# Patient Record
Sex: MALE | URBAN - METROPOLITAN AREA
[De-identification: names, ages, dates, MRNs, and addresses within clinical notes are randomized per-mention and may not be internally consistent; named-entity substitution may affect disease eponyms.]

---

## 2021-03-04 ENCOUNTER — OFFICE VISIT (OUTPATIENT)
Dept: CARDIOLOGY | Facility: CLINIC | Age: 61
End: 2021-03-04
Attending: INTERNAL MEDICINE
Payer: MEDICARE

## 2021-03-04 VITALS
SYSTOLIC BLOOD PRESSURE: 145 MMHG | HEART RATE: 54 BPM | OXYGEN SATURATION: 97 % | HEIGHT: 73 IN | WEIGHT: 246 LBS | DIASTOLIC BLOOD PRESSURE: 90 MMHG | BODY MASS INDEX: 32.6 KG/M2

## 2021-03-04 DIAGNOSIS — I48.0 PAROXYSMAL ATRIAL FIBRILLATION (H): Primary | ICD-10-CM

## 2021-03-04 PROCEDURE — 99203 OFFICE O/P NEW LOW 30 MIN: CPT | Mod: GC | Performed by: INTERNAL MEDICINE

## 2021-03-04 PROCEDURE — G0463 HOSPITAL OUTPT CLINIC VISIT: HCPCS

## 2021-03-04 RX ORDER — CLOPIDOGREL BISULFATE 75 MG/1
75 TABLET ORAL DAILY
COMMUNITY
Start: 2021-03-02

## 2021-03-04 RX ORDER — METOPROLOL TARTRATE 25 MG/1
25 TABLET, FILM COATED ORAL DAILY
COMMUNITY
Start: 2020-11-23

## 2021-03-04 RX ORDER — HYDROCODONE BITARTRATE AND ACETAMINOPHEN 5; 325 MG/1; MG/1
TABLET ORAL
COMMUNITY
Start: 2020-09-26

## 2021-03-04 RX ORDER — METHOCARBAMOL 500 MG/1
500 TABLET, FILM COATED ORAL
COMMUNITY
Start: 2020-12-04

## 2021-03-04 RX ORDER — ALBUTEROL SULFATE 90 UG/1
AEROSOL, METERED RESPIRATORY (INHALATION)
COMMUNITY
Start: 2021-03-02

## 2021-03-04 RX ORDER — NITROGLYCERIN 0.4 MG/1
0.4 TABLET SUBLINGUAL PRN
COMMUNITY
Start: 2021-01-04

## 2021-03-04 RX ORDER — OXYCODONE HYDROCHLORIDE 5 MG/1
TABLET ORAL
COMMUNITY
Start: 2020-11-23

## 2021-03-04 RX ORDER — ACETAMINOPHEN 500 MG
500 TABLET ORAL PRN
COMMUNITY
Start: 2020-12-04

## 2021-03-04 RX ORDER — ATORVASTATIN CALCIUM 40 MG/1
40 TABLET, FILM COATED ORAL DAILY
COMMUNITY
Start: 2020-12-04

## 2021-03-04 RX ORDER — METOPROLOL SUCCINATE 100 MG/1
100 TABLET, EXTENDED RELEASE ORAL
COMMUNITY
Start: 2020-12-04

## 2021-03-04 RX ORDER — LEVETIRACETAM 1000 MG/1
TABLET ORAL
COMMUNITY
Start: 2021-01-18

## 2021-03-04 RX ORDER — ALBUTEROL SULFATE 90 UG/1
1-2 AEROSOL, METERED RESPIRATORY (INHALATION) DAILY
COMMUNITY
Start: 2021-01-08

## 2021-03-04 RX ORDER — LISINOPRIL AND HYDROCHLOROTHIAZIDE 12.5; 2 MG/1; MG/1
TABLET ORAL
COMMUNITY
Start: 2020-10-17

## 2021-03-04 RX ORDER — FLUTICASONE PROPIONATE 50 MCG
1 SPRAY, SUSPENSION (ML) NASAL DAILY
COMMUNITY
Start: 2021-01-04

## 2021-03-04 RX ORDER — LACOSAMIDE 100 MG/1
100 TABLET, FILM COATED ORAL 2 TIMES DAILY
COMMUNITY
Start: 2021-02-18

## 2021-03-04 RX ORDER — DABIGATRAN ETEXILATE 150 MG/1
150 CAPSULE ORAL DAILY
COMMUNITY
Start: 2021-03-01

## 2021-03-04 SDOH — HEALTH STABILITY: MENTAL HEALTH: HOW OFTEN DO YOU HAVE 6 OR MORE DRINKS ON ONE OCCASION?: NEVER

## 2021-03-04 SDOH — HEALTH STABILITY: MENTAL HEALTH: HOW MANY STANDARD DRINKS CONTAINING ALCOHOL DO YOU HAVE ON A TYPICAL DAY?: NOT ASKED

## 2021-03-04 SDOH — HEALTH STABILITY: MENTAL HEALTH: HOW OFTEN DO YOU HAVE A DRINK CONTAINING ALCOHOL?: NEVER

## 2021-03-04 ASSESSMENT — MIFFLIN-ST. JEOR: SCORE: 1989.73

## 2021-03-04 ASSESSMENT — PAIN SCALES - GENERAL: PAINLEVEL: NO PAIN (0)

## 2021-03-04 NOTE — PROGRESS NOTES
Cardiology Consultation     Jose Jimenez MRN# [id number]       Reason for consult: Evaluation for possible watchmann    HPI: Jose Jimenez is a 60 year old male with history including recurrent bilateral ischemic strokes that have occurred since early 2000s, history of flutter addressed with ablation in 2011/2012, history of atrial fibrillation picked up on an implantable loop recorder device of only 18 minutes long, chronic anticoagulation with Plavix and Pradaxa who presents for evaluation of possible Watchmann device.  He is referred by Dr. Arguello for evaluation and possible Watchmann.  Denies palpitations.  No infectious symptoms.  No anginal symptoms.    Neuro History:  2002: Brain MRI showed small vessel ischemic disease (patient's age ~ 42 years)  2003: Brain imaging revealed evidence of old frontal infarct  2004: Acute stroke  5/25/2015: Ischemic stroke  3/4/2016: TIA  3/21/2016: Left parietal stroke  6/2/2017: Subdural hematoma  7/14/2017: TIA  10/25/2020: Right MCA territory ischemic stroke (most recent)    Past Medical History:  Atrial flutter  Hyperlipidemia  Atrial fibrillation  Strokes as detailed above  Prior intracranial bleed  Seizures    Past Surgical History:  As above    Allergies:   No Known Allergies    Medications:  acetaminophen (TYLENOL) 500 MG tablet, Take 500 mg by mouth as needed  albuterol (PROAIR HFA/PROVENTIL HFA/VENTOLIN HFA) 108 (90 Base) MCG/ACT inhaler, Inhale 1-2 puffs into the lungs daily  albuterol (PROAIR HFA/PROVENTIL HFA/VENTOLIN HFA) 108 (90 Base) MCG/ACT inhaler,   atorvastatin (LIPITOR) 40 MG tablet, Take 40 mg by mouth daily  clopidogrel (PLAVIX) 75 MG tablet, Take 75 mg by mouth daily  dabigatran ANTICOAGULANT (PRADAXA ANTICOAGULANT) 150 MG capsule, Take 150 mg by mouth daily  fluticasone (FLONASE) 50 MCG/ACT nasal spray, Spray 1 spray in nostril daily  HYDROcodone-acetaminophen (NORCO) 5-325 MG tablet, TK 1 T PO  Q 8 H PRN P  levETIRAcetam (KEPPRA)  1000 MG tablet, TAKE 2 TABLETS BY MOUTH TWICE DAILY  lisinopril-hydrochlorothiazide (ZESTORETIC) 20-12.5 MG tablet, TK 1 T PO D  methocarbamol (ROBAXIN) 500 MG tablet, Take 500 mg by mouth  metoprolol succinate ER (TOPROL-XL) 100 MG 24 hr tablet, Take 100 mg by mouth  metoprolol tartrate (LOPRESSOR) 25 MG tablet, Take 25 mg by mouth daily  nitroGLYcerin (NITROSTAT) 0.4 MG sublingual tablet, Place 0.4 mg under the tongue as needed  oxyCODONE (ROXICODONE) 5 MG tablet, TK 1 TO 2 TS PO Q 6 H PRF MODERATE TO SEVERE PAIN  VIMPAT 100 MG TABS tablet, Take 100 mg by mouth 2 times daily    No current facility-administered medications on file prior to visit.       Social History:  Social History     Socioeconomic History     Marital status: Single     Spouse name: Not on file     Number of children: Not on file     Years of education: Not on file     Highest education level: Not on file   Occupational History     Not on file   Social Needs     Financial resource strain: Not on file     Food insecurity     Worry: Not on file     Inability: Not on file     Transportation needs     Medical: Not on file     Non-medical: Not on file   Tobacco Use     Smoking status: Never Smoker     Smokeless tobacco: Never Used   Substance and Sexual Activity     Alcohol use: Never     Frequency: Never     Binge frequency: Never     Drug use: Never     Sexual activity: Not on file   Lifestyle     Physical activity     Days per week: Not on file     Minutes per session: Not on file     Stress: Not on file   Relationships     Social connections     Talks on phone: Not on file     Gets together: Not on file     Attends [employer] service: Not on file     Active member of club or organization: Not on file     Attends meetings of clubs or organizations: Not on file     Relationship status: Not on file     Intimate partner violence     Fear of current or ex partner: Not on file     Emotionally abused: Not on file     Physically abused: Not on file      Forced sexual activity: Not on file   Other Topics Concern     Not on file   Social History Narrative     Not on file       Family History:  Reviewed.  No known clotting disorders/early CAD.  Mother had a pacemaker.    ROS:  CONSTITUTIONAL:No report of fevers or chills  RESPIRATORY: No cough, wheezing, SOB, or hemoptysis  CARDIOVASCULAR: see HPI  MUSCULO-SKELETAL: No joint pain/swelling, no muslce pain  NEURO: No paresthesias, syncope, pre-syncope, light headness, dizziness or vertigo  ENDOCRINE: No temperature intolerance, no skin/hair changes  PSYCHIATRIC: No change in mood, feeling down/anxious, no change in sleep or appetite  GI: no melena or hematochezia, no change in bowel habits  : no hematuria or dysurea, no hesitancy, dribbling or incontinence  HEME: no easy bruising or bleeding, no history of anemia, no history of blood clots  SKIN: no rashes or sores, no unusual itching    Exam:  Pulse:  [54] 54  BP: (145)/(90) 145/90  SpO2:  [97 %] 97 %  General: Alert, interactive, NAD  Eyes: sclera anicteric, EOMI  Neck: JVP without elevation, carotid 2+ bilaterally  Cardiovascular: regular rate and rhythm, normal S1 and S2, no murmurs, gallops, or rubs  Resp: clear to auscultation bilaterally, no rales, wheezes, or rhonchi  GI: Soft, nontender, nondistended. +BS.  No HSM or masses, no rebound or guarding.  Extremities: without edema, no cyanosis or clubbing, dorsalis pedis and posterior tibialis pulses 2+ bilaterally  Skin: Warm and dry, no jaundice or rash  Neuro: CN 2-12 intact, moves all extremities equally  Psych: Alert & oriented x 3    Data:  EKG Report From [facility]:  Sinus bradycardia  Incomplete right bundle branch block  Borderline ECG  No previous ECGs available    MRA Neck:  FINDINGS:  RIGHT CAROTID: No measurable stenosis or dissection.    LEFT CAROTID: No measurable stenosis or dissection.    VERTEBRAL ARTERIES: No focal stenosis or dissection. Dominant left and smaller  right vertebral arteries.    AORTIC  ARCH: Classic aortic arch anatomy with no significant stenosis at the  origin of the great vessels.    IMPRESSION:  1.  Normal neck MRA.    Head MRI:  IMPRESSION:  HEAD MRI:   1.  Small to moderate size region of acute infarction within the posterior and  lateral aspects of the right frontal lobe, within the right middle cerebral  artery territory.  2.  Several regions of chronic infarction throughout both cerebral hemispheres,  greatest in the left middle cerebral artery posterior division territory, left  posterior cerebral artery territory and right middle cerebral artery anterior  division territory.  3.  Mild to moderate chronic small vessel ischemic disease and mild generalized  brain parenchymal volume loss.    JOSIE 2016 following stroke:  Normal LV function  Left Atrium Summary  Normal left atrial size.  No evidence for left atrial thrombus.  No evidence for left atrial appendage thrombus.    No left atrial appendage thrombus.  Normal flow velocities on Doppler evaluation of the left atrial appendage  thrombus.  No evidence for left atrial thrombus or spontaneous echo contrast.    JOSIE 2015:  1. NORMAL DANIELLE MECHANICAL FUNCTION (BASED ON DANIELLE FLOW VELOCITIES0  2. NO SPONTANEOUS ECHO CONTRAST  3. NO INTRACARDIAC CLOT  4. NO INTRACARDIAC SHUNT  5. SIGNIFICANT NON-MOBILE ATHEROSCLEROTIC DISEASE IN DESC THORACIC AORTA  AND PROXIMAL PORTION OF TRANSVERSE AORTIC ARCH.    Assessment:   60 year old male presents for evaluation/second opinion at request of Dr. Jonny Arguello for recurrent strokes in setting of a remote history of atrial fibrillation.  Recurrent strokes appear to not be related to his arrhythmias and aside from one event of an ICH he is tolerating Plavix and Pradaxa.  I suspect this is related to aortic calcifications/atherosclerotic disease.  He does have a history of Factor V Leiden (heterozygous) but this is likely incidental.  There is no evidence of an intracardiac shunt as detailed above on 2015 JOSIE.   We agree with Dr. Arguello's assessment from his 1/28/2021 note that it is very unlikely that an left atrial appendage thrombus is the cause of the patient's ischemic events given imaging findings of the left atrium, left atrial velocities and extremely low atrial fibrillation burden as assessed over a 20 month period.  Given this, we feel the procedure would be unlikely to benefit the patient and after our explanation we mutually agreed with patient to not proceed with Watchmann device placement.    Recommendations:  - Continue Plavix and Pradaxa  - Follow up with Dr. Arguello    Patient seen and discussed with Dr. Mcmahon.  Dr. Mcmahon will touch base with Dr. Arguello to discuss our impression/recommendations.    Return to clinic as needed.    Bj Campos MD  Interventional Cardiology Fellow      Patient seen and examined with Cardiovascular fellow and agree with the assessment and plan described above.     Hitesh Mcmahon M.D.  Interventional Cardiology  [facility]

## 2021-03-04 NOTE — NURSING NOTE
Chief Complaint   Patient presents with     New Patient     Discuss watchman device      Vitals were taken and medications where reconciled.    Julio Negro, EMT  1:31 PM

## 2021-03-04 NOTE — PATIENT INSTRUCTIONS
You were seen today in the Cardiovascular Clinic at the [facility].      Cardiology Providers you saw during your visit:  Dr. Mcmahon    Recommendations: We are not recommending the Watchman procedure.    Follow-up: With your cardiologist as needed at [facility].       Thank you for your visit today!     Mallory Thomas RN  Structural Heart Care Coordinator   TAVR, MitraClip and Watchman Programs  [facility] Shelly Louie, [facility] Office: [phone]      [facility]  [address] [place] [address]  Cardiology Clinic [address]  [place] [address]

## 2021-03-04 NOTE — LETTER
3/4/2021      RE: Jose Jimenez  [id number]  [place] [address]       Dear Colleague,    Thank you for the opportunity to participate in the care of your patient, Jose Jimenez, at the [facility] [place] at [facility]. Please see a copy of my visit note below.                    Cardiology Consultation     Jose Jimenez MRN# [id number]       Reason for consult: Evaluation for possible watchmann    HPI: Jose Jimenez is a 60 year old male with history including recurrent bilateral ischemic strokes that have occurred since early 2000s, history of flutter addressed with ablation in 2011/2012, history of atrial fibrillation picked up on an implantable loop recorder device of only 18 minutes long, chronic anticoagulation with Plavix and Pradaxa who presents for evaluation of possible Watchmann device.  He is referred by Dr. Arguello for evaluation and possible Watchmann.  Denies palpitations.  No infectious symptoms.  No anginal symptoms.    Neuro History:  2002: Brain MRI showed small vessel ischemic disease (patient's age ~ 42 years)  2003: Brain imaging revealed evidence of old frontal infarct  2004: Acute stroke  5/25/2015: Ischemic stroke  3/4/2016: TIA  3/21/2016: Left parietal stroke  6/2/2017: Subdural hematoma  7/14/2017: TIA  10/25/2020: Right MCA territory ischemic stroke (most recent)    Past Medical History:  Atrial flutter  Hyperlipidemia  Atrial fibrillation  Strokes as detailed above  Prior intracranial bleed  Seizures    Past Surgical History:  As above    Allergies:   No Known Allergies    Medications:  acetaminophen (TYLENOL) 500 MG tablet, Take 500 mg by mouth as needed  albuterol (PROAIR HFA/PROVENTIL HFA/VENTOLIN HFA) 108 (90 Base) MCG/ACT inhaler, Inhale 1-2 puffs into the lungs daily  albuterol (PROAIR HFA/PROVENTIL HFA/VENTOLIN HFA) 108 (90 Base) MCG/ACT inhaler,   atorvastatin (LIPITOR) 40 MG tablet, Take 40 mg by mouth  daily  clopidogrel (PLAVIX) 75 MG tablet, Take 75 mg by mouth daily  dabigatran ANTICOAGULANT (PRADAXA ANTICOAGULANT) 150 MG capsule, Take 150 mg by mouth daily  fluticasone (FLONASE) 50 MCG/ACT nasal spray, Spray 1 spray in nostril daily  HYDROcodone-acetaminophen (NORCO) 5-325 MG tablet, TK 1 T PO  Q 8 H PRN P  levETIRAcetam (KEPPRA) 1000 MG tablet, TAKE 2 TABLETS BY MOUTH TWICE DAILY  lisinopril-hydrochlorothiazide (ZESTORETIC) 20-12.5 MG tablet, TK 1 T PO D  methocarbamol (ROBAXIN) 500 MG tablet, Take 500 mg by mouth  metoprolol succinate ER (TOPROL-XL) 100 MG 24 hr tablet, Take 100 mg by mouth  metoprolol tartrate (LOPRESSOR) 25 MG tablet, Take 25 mg by mouth daily  nitroGLYcerin (NITROSTAT) 0.4 MG sublingual tablet, Place 0.4 mg under the tongue as needed  oxyCODONE (ROXICODONE) 5 MG tablet, TK 1 TO 2 TS PO Q 6 H PRF MODERATE TO SEVERE PAIN  VIMPAT 100 MG TABS tablet, Take 100 mg by mouth 2 times daily    No current facility-administered medications on file prior to visit.       Social History:  Social History     Socioeconomic History     Marital status: Single     Spouse name: Not on file     Number of children: Not on file     Years of education: Not on file     Highest education level: Not on file   Occupational History     Not on file   Social Needs     Financial resource strain: Not on file     Food insecurity     Worry: Not on file     Inability: Not on file     Transportation needs     Medical: Not on file     Non-medical: Not on file   Tobacco Use     Smoking status: Never Smoker     Smokeless tobacco: Never Used   Substance and Sexual Activity     Alcohol use: Never     Frequency: Never     Binge frequency: Never     Drug use: Never     Sexual activity: Not on file   Lifestyle     Physical activity     Days per week: Not on file     Minutes per session: Not on file     Stress: Not on file   Relationships     Social connections     Talks on phone: Not on file     Gets together: Not on file     Attends  [employer] service: Not on file     Active member of club or organization: Not on file     Attends meetings of clubs or organizations: Not on file     Relationship status: Not on file     Intimate partner violence     Fear of current or ex partner: Not on file     Emotionally abused: Not on file     Physically abused: Not on file     Forced sexual activity: Not on file   Other Topics Concern     Not on file   Social History Narrative     Not on file       Family History:  Reviewed.  No known clotting disorders/early CAD.  Mother had a pacemaker.    ROS:  CONSTITUTIONAL:No report of fevers or chills  RESPIRATORY: No cough, wheezing, SOB, or hemoptysis  CARDIOVASCULAR: see HPI  MUSCULO-SKELETAL: No joint pain/swelling, no muslce pain  NEURO: No paresthesias, syncope, pre-syncope, light headness, dizziness or vertigo  ENDOCRINE: No temperature intolerance, no skin/hair changes  PSYCHIATRIC: No change in mood, feeling down/anxious, no change in sleep or appetite  GI: no melena or hematochezia, no change in bowel habits  : no hematuria or dysurea, no hesitancy, dribbling or incontinence  HEME: no easy bruising or bleeding, no history of anemia, no history of blood clots  SKIN: no rashes or sores, no unusual itching    Exam:  Pulse:  [54] 54  BP: (145)/(90) 145/90  SpO2:  [97 %] 97 %  General: Alert, interactive, NAD  Eyes: sclera anicteric, EOMI  Neck: JVP without elevation, carotid 2+ bilaterally  Cardiovascular: regular rate and rhythm, normal S1 and S2, no murmurs, gallops, or rubs  Resp: clear to auscultation bilaterally, no rales, wheezes, or rhonchi  GI: Soft, nontender, nondistended. +BS.  No HSM or masses, no rebound or guarding.  Extremities: without edema, no cyanosis or clubbing, dorsalis pedis and posterior tibialis pulses 2+ bilaterally  Skin: Warm and dry, no jaundice or rash  Neuro: CN 2-12 intact, moves all extremities equally  Psych: Alert & oriented x 3    Data:  EKG Report From [facility]:  Sinus  bradycardia  Incomplete right bundle branch block  Borderline ECG  No previous ECGs available    MRA Neck:  FINDINGS:  RIGHT CAROTID: No measurable stenosis or dissection.    LEFT CAROTID: No measurable stenosis or dissection.    VERTEBRAL ARTERIES: No focal stenosis or dissection. Dominant left and smaller  right vertebral arteries.    AORTIC ARCH: Classic aortic arch anatomy with no significant stenosis at the  origin of the great vessels.    IMPRESSION:  1.  Normal neck MRA.    Head MRI:  IMPRESSION:  HEAD MRI:   1.  Small to moderate size region of acute infarction within the posterior and  lateral aspects of the right frontal lobe, within the right middle cerebral  artery territory.  2.  Several regions of chronic infarction throughout both cerebral hemispheres,  greatest in the left middle cerebral artery posterior division territory, left  posterior cerebral artery territory and right middle cerebral artery anterior  division territory.  3.  Mild to moderate chronic small vessel ischemic disease and mild generalized  brain parenchymal volume loss.    JOSIE 2016 following stroke:  Normal LV function  Left Atrium Summary  Normal left atrial size.  No evidence for left atrial thrombus.  No evidence for left atrial appendage thrombus.    No left atrial appendage thrombus.  Normal flow velocities on Doppler evaluation of the left atrial appendage  thrombus.  No evidence for left atrial thrombus or spontaneous echo contrast.    JOSIE 2015:  1. NORMAL DANIELLE MECHANICAL FUNCTION (BASED ON DANIELLE FLOW VELOCITIES0  2. NO SPONTANEOUS ECHO CONTRAST  3. NO INTRACARDIAC CLOT  4. NO INTRACARDIAC SHUNT  5. SIGNIFICANT NON-MOBILE ATHEROSCLEROTIC DISEASE IN DESC THORACIC AORTA  AND PROXIMAL PORTION OF TRANSVERSE AORTIC ARCH.    Assessment:   60 year old male presents for evaluation/second opinion at request of Dr. Jonny Arguello for recurrent strokes in setting of a remote history of atrial fibrillation.  Recurrent strokes appear to not be  related to his arrhythmias and aside from one event of an ICH he is tolerating Plavix and Pradaxa.  I suspect this is related to aortic calcifications/atherosclerotic disease.  He does have a history of Factor V Leiden (heterozygous) but this is likely incidental.  There is no evidence of an intracardiac shunt as detailed above on 2015 JOSIE.  We agree with Dr. Arguello's assessment from his 1/28/2021 note that it is very unlikely that an left atrial appendage thrombus is the cause of the patient's ischemic events given imaging findings of the left atrium, left atrial velocities and extremely low atrial fibrillation burden as assessed over a 20 month period.  Given this, we feel the procedure would be unlikely to benefit the patient and after our explanation we mutually agreed with patient to not proceed with Watchmann device placement.    Recommendations:  - Continue Plavix and Pradaxa  - Follow up with Dr. Arguello    Patient seen and discussed with Dr. Mcmahon.  Dr. Mcmahon will touch base with Dr. Arguello to discuss our impression/recommendations.    Return to clinic as needed.    Bj Campos MD  Interventional Cardiology Fellow      Patient seen and examined with Cardiovascular fellow and agree with the assessment and plan described above.     Hitesh Mcmahon M.D.  Interventional Cardiology  [facility]               Please do not hesitate to contact me if you have any questions/concerns.     Sincerely,     Hitesh Mcmahon MD